# Patient Record
(demographics unavailable — no encounter records)

---

## 2025-01-31 NOTE — REVIEW OF SYSTEMS
[Eyesight Problems] : eyesight problems [As Noted in HPI] : as noted in HPI [Fever] : no fever [Chills] : no chills [Loss Of Hearing] : no hearing loss [Sore Throat] : no sore throat [Hoarseness] : no hoarseness [Heart Rate Is Slow] : the heart rate was not slow [Heart Rate Is Fast] : the heart rate was not fast [Chest Pain] : no chest pain [Palpitations] : no palpitations [Lower Ext Edema] : no lower extremity edema [Shortness Of Breath] : no shortness of breath [Cough] : no cough [SOB on Exertion] : no shortness of breath during exertion [Constipation] : no constipation [Dysuria] : no dysuria [Incontinence] : no incontinence [Arthralgias] : no arthralgias [Skin Lesions] : no skin lesions [Skin Wound] : no skin wound [Confused] : no confusion [Suicidal] : not suicidal [FreeTextEntry9] : LBP radiating to right leg

## 2025-01-31 NOTE — END OF VISIT
[] : Fellow [FreeTextEntry3] : 74-year-old female with a history outlined above comes in for a follow-up visit after being lost to follow-up.  Of utmost importance patient has a history of anemia and there is a concern for melena.  She does have some posttraumatic history and is apprehensive at times for us to provide her complete care.  Today she comes with a healthcare proxy and distant family member who assured us that she will maintain a GI appointment immediately.  In the meantime we will follow-up labs including hemoglobin and iron levels.  Rest of the plan as outlined above [Time Spent: ___ minutes] : I have spent [unfilled] minutes of time on the encounter which excludes teaching and separately reported services.

## 2025-01-31 NOTE — PHYSICAL EXAM
[Alert] : alert [Sclera] : the sclera and conjunctiva were normal [Normal Appearance] : the appearance of the neck was normal [No Acc Muscle Use] : no accessory muscle use [Auscultation Breath Sounds / Voice Sounds] : lungs were clear to auscultation bilaterally [Normal S1, S2] : normal S1 and S2 [Motor Tone] : muscle strength and tone were normal [No Focal Deficits] : no focal deficits [Normal Affect] : the affect was normal [Normal Mood] : the mood was normal [de-identified] : 1+ edema LE's [EOMI] : extraocular movements were intact [Supple] : the neck was supple [Edema] : edema was not present [Bowel Sounds] : normal bowel sounds [Abdomen Tenderness] : non-tender [Abdomen Soft] : soft [No CVA Tenderness] : no CVA  tenderness [No Spinal Tenderness] : no spinal tenderness [Normal Gait] : normal gait [No Clubbing, Cyanosis] : no clubbing or cyanosis of the fingernails [No Respiratory Distress] : no respiratory distress [de-identified] : Right sided SLR positive

## 2025-01-31 NOTE — HISTORY OF PRESENT ILLNESS
[Patient reported dental screening is abnormal] : Patient reported dental screening is abnormal [No falls in past year] : Patient reported no falls in the past year [Patient is independent with] : bathing [Independent] : managing finances [0] : 2) Feeling down, depressed, or hopeless: Not at all (0) [PHQ-2 Negative - No further assessment needed] : PHQ-2 Negative - No further assessment needed [I will adhere to the patient's wishes.] : I will adhere to the patient's wishes. [AdvancecareDate] : 3/26/24 [] : shopping [FreeTextEntry1] : HPI: 74-year-old female recent fall and s/p left hip arthroplasty at [place] 11/4/23, HTN, HLD, LA esophagitis, large hiatal hernia, depression, MDD, OCD, schizoaffective disorder comes in for acute LBP.  Patient states that she was sitting with her cat a month ago and noticed the LBP when she got up. pain is constant, radiating down right leg. No loss of sensation or bowel bladder dysfunction. No aggravating or relieving factors. Denies any preceding trauma or lifting heavy objects.   Patient also complains of black colored stool x 1 year. alternating with diarrhea. She is not currently taking any iron supplements.    lost to follow up with gastroenterology as well. Put in a referral again h/o of ZAC on low normal side  egd done 2018 with LA esophagitis, not on PPI anymore colo 2018 last due to follow up h/o of tubular adenoma   Depression/anxiety/MDD/schizoaffective follows with dr foreman  on quetiapine 175 mg morning and night 375 mg sertraline 150 at bedtime and mirtazapine 15 mg at bedtime  she gets pharmacy prescriptions from dr kellen BYRNES  on statin      [TextBox_37] : optometry referral provided  [TextBox_25] : will order one today [TextBox_43] : no teeth does not want dentures  [TextBox_19] : 2018  [de-identified] : one fall with surgery  [NO] : No [Little interest or pleasure doing things] : 1) Little interest or pleasure doing things [Feeling down, depressed, or hopeless] : 2) Feeling down, depressed, or hopeless [[id number]] : 0 [FreeTextEntry4] : david rod is hcp

## 2025-01-31 NOTE — HISTORY OF PRESENT ILLNESS
[Patient reported dental screening is abnormal] : Patient reported dental screening is abnormal [No falls in past year] : Patient reported no falls in the past year [Patient is independent with] : bathing [Independent] : managing finances [0] : 2) Feeling down, depressed, or hopeless: Not at all (0) [PHQ-2 Negative - No further assessment needed] : PHQ-2 Negative - No further assessment needed [I will adhere to the patient's wishes.] : I will adhere to the patient's wishes. [AdvancecareDate] : 3/26/24 [] : shopping [FreeTextEntry1] : HPI: 74-year-old female recent fall and s/p left hip arthroplasty at [facility] 11/4/23, HTN, HLD, LA esophagitis, large hiatal hernia, depression, MDD, OCD, schizoaffective disorder comes in for acute LBP.  Patient states that she was sitting with her cat a month ago and noticed the LBP when she got up. pain is constant, radiating down right leg. No loss of sensation or bowel bladder dysfunction. No aggravating or relieving factors. Denies any preceding trauma or lifting heavy objects.   Patient also complains of black colored stool x 1 year. alternating with diarrhea. She is not currently taking any iron supplements.    lost to follow up with gastroenterology as well. Put in a referral again h/o of ZAC on low normal side  egd done 2018 with LA esophagitis, not on PPI anymore colo 2018 last due to follow up h/o of tubular adenoma   Depression/anxiety/MDD/schizoaffective follows with dr foreman  on quetiapine 175 mg morning and night 375 mg sertraline 150 at bedtime and mirtazapine 15 mg at bedtime  she gets pharmacy prescriptions from dr kellen BYRNES  on statin      [TextBox_37] : optometry referral provided  [TextBox_25] : will order one today [TextBox_43] : no teeth does not want dentures  [TextBox_19] : 2018  [de-identified] : one fall with surgery  [NO] : No [Little interest or pleasure doing things] : 1) Little interest or pleasure doing things [Feeling down, depressed, or hopeless] : 2) Feeling down, depressed, or hopeless [[id number]] : 0 [FreeTextEntry4] : david rod is hcp

## 2025-01-31 NOTE — HISTORY OF PRESENT ILLNESS
[Patient reported dental screening is abnormal] : Patient reported dental screening is abnormal [No falls in past year] : Patient reported no falls in the past year [Patient is independent with] : bathing [Independent] : managing finances [0] : 2) Feeling down, depressed, or hopeless: Not at all (0) [PHQ-2 Negative - No further assessment needed] : PHQ-2 Negative - No further assessment needed [I will adhere to the patient's wishes.] : I will adhere to the patient's wishes. [AdvancecareDate] : 3/26/24 [] : shopping [FreeTextEntry1] : HPI: 74-year-old female recent fall and s/p left hip arthroplasty at [place] 11/4/23, HTN, HLD, LA esophagitis, large hiatal hernia, depression, MDD, OCD, schizoaffective disorder comes in for acute LBP.  Patient states that she was sitting with her cat a month ago and noticed the LBP when she got up. pain is constant, radiating down right leg. No loss of sensation or bowel bladder dysfunction. No aggravating or relieving factors. Denies any preceding trauma or lifting heavy objects.   Patient also complains of black colored stool x 1 year. alternating with diarrhea. She is not currently taking any iron supplements.    lost to follow up with gastroenterology as well. Put in a referral again h/o of ZAC on low normal side  egd done 2018 with LA esophagitis, not on PPI anymore colo 2018 last due to follow up h/o of tubular adenoma   Depression/anxiety/MDD/schizoaffective follows with dr foreman  on quetiapine 175 mg morning and night 375 mg sertraline 150 at bedtime and mirtazapine 15 mg at bedtime  she gets pharmacy prescriptions from dr kellen BYRNES  on statin      [TextBox_25] : will order one today [TextBox_37] : optometry referral provided  [TextBox_43] : no teeth does not want dentures  [TextBox_19] : 2018  [de-identified] : one fall with surgery  [NO] : No [Little interest or pleasure doing things] : 1) Little interest or pleasure doing things [Feeling down, depressed, or hopeless] : 2) Feeling down, depressed, or hopeless [[id number]] : 0 [FreeTextEntry4] : david rod is hcp

## 2025-01-31 NOTE — PHYSICAL EXAM
[Alert] : alert [Sclera] : the sclera and conjunctiva were normal [Normal Appearance] : the appearance of the neck was normal [No Acc Muscle Use] : no accessory muscle use [Auscultation Breath Sounds / Voice Sounds] : lungs were clear to auscultation bilaterally [Normal S1, S2] : normal S1 and S2 [Motor Tone] : muscle strength and tone were normal [No Focal Deficits] : no focal deficits [Normal Affect] : the affect was normal [Normal Mood] : the mood was normal [de-identified] : 1+ edema LE's [EOMI] : extraocular movements were intact [Supple] : the neck was supple [Edema] : edema was not present [Bowel Sounds] : normal bowel sounds [Abdomen Tenderness] : non-tender [Abdomen Soft] : soft [No CVA Tenderness] : no CVA  tenderness [No Spinal Tenderness] : no spinal tenderness [Normal Gait] : normal gait [No Clubbing, Cyanosis] : no clubbing or cyanosis of the fingernails [No Respiratory Distress] : no respiratory distress [de-identified] : Right sided SLR positive

## 2025-01-31 NOTE — PHYSICAL EXAM
[Alert] : alert [Sclera] : the sclera and conjunctiva were normal [Normal Appearance] : the appearance of the neck was normal [No Acc Muscle Use] : no accessory muscle use [Auscultation Breath Sounds / Voice Sounds] : lungs were clear to auscultation bilaterally [Normal S1, S2] : normal S1 and S2 [Motor Tone] : muscle strength and tone were normal [No Focal Deficits] : no focal deficits [Normal Affect] : the affect was normal [Normal Mood] : the mood was normal [de-identified] : 1+ edema LE's [EOMI] : extraocular movements were intact [Supple] : the neck was supple [Edema] : edema was not present [Bowel Sounds] : normal bowel sounds [Abdomen Tenderness] : non-tender [Abdomen Soft] : soft [No CVA Tenderness] : no CVA  tenderness [No Spinal Tenderness] : no spinal tenderness [Normal Gait] : normal gait [No Clubbing, Cyanosis] : no clubbing or cyanosis of the fingernails [No Respiratory Distress] : no respiratory distress [de-identified] : Right sided SLR positive